# Patient Record
Sex: FEMALE | Race: WHITE | NOT HISPANIC OR LATINO | Employment: FULL TIME | URBAN - METROPOLITAN AREA
[De-identification: names, ages, dates, MRNs, and addresses within clinical notes are randomized per-mention and may not be internally consistent; named-entity substitution may affect disease eponyms.]

---

## 2022-03-21 ENCOUNTER — MEDICAL CORRESPONDENCE (OUTPATIENT)
Dept: HEALTH INFORMATION MANAGEMENT | Facility: CLINIC | Age: 52
End: 2022-03-21
Payer: COMMERCIAL

## 2022-03-22 ENCOUNTER — TRANSCRIBE ORDERS (OUTPATIENT)
Dept: OTHER | Age: 52
End: 2022-03-22
Payer: COMMERCIAL

## 2022-03-22 DIAGNOSIS — E04.1 THYROID NODULE: Primary | ICD-10-CM

## 2022-03-23 NOTE — TELEPHONE ENCOUNTER
FUTURE VISIT INFORMATION      FUTURE VISIT INFORMATION:    Date: 22    Time: 2:20PM    [place] [facility] [place]ERRAL INFORMATION:    Referring provider:  Deborah Yan MD      Referring providers clinic:  [facility]     Reason for visit/diagnosis  appt per pt - Thyroid nodule [E04.1]. referred by Dr. Deborah Yan. med recs in [facility].    RECORDS REQUESTED FROM:       Clinic name Comments Records Status Imaging Status   [facility] Family Medicine   22 note from Deborah Yan MD   Care everywhere     [facility] 3/7/22 US FNA   22 US Thyroid   19 US Thyroid   3/25/18 CT Head Care everywhere  req 3/23/22 - PACS   [facility] LABORATORY SERVICES - [facility]  [address]      [place] [address]  Phone: [phone] 3/7/22 THYROID, LEFT, FINE NEEDLE ASPIRATION ([id number])    *trackin Care everywhere    Path req 3/23/22    Received 3/28/22                        3/23/22 9:02AM Sent a fax to [facility] for images and path - Amay   3/25/22 3:39PM received images in PACS, waiting for path - Amay   3/28/22 9AM received path, sent for consult - Amay

## 2022-03-29 ENCOUNTER — LAB (OUTPATIENT)
Dept: LAB | Facility: CLINIC | Age: 52
End: 2022-03-29
Payer: COMMERCIAL

## 2022-03-29 DIAGNOSIS — E04.1 THYROID NODULE: Primary | ICD-10-CM

## 2022-03-29 PROCEDURE — 88321 CONSLTJ&REPRT SLD PREP ELSWR: CPT | Mod: GC | Performed by: PATHOLOGY

## 2022-04-03 ENCOUNTER — HEALTH MAINTENANCE LETTER (OUTPATIENT)
Age: 52
End: 2022-04-03

## 2022-04-05 LAB
PATH REPORT.COMMENTS IMP SPEC: NORMAL
PATH REPORT.FINAL DX SPEC: NORMAL
PATH REPORT.GROSS SPEC: NORMAL
PATH REPORT.MICROSCOPIC SPEC OTHER STN: NORMAL
PATH REPORT.RELEVANT HX SPEC: NORMAL
PATH REPORT.RELEVANT HX SPEC: NORMAL
PATH REPORT.SITE OF ORIGIN SPEC: NORMAL

## 2022-04-11 NOTE — PROGRESS NOTES
Dear Dr. Yan:    I had the pleasure of meeting Ashwini Livingston in consultation today at the [facility] at your request.     History of Present Illness:   Ashwini Livingston is a 51 year old woman referred for evaluation of a thyroid nodule. She saw her PCP on 2/11/2022 for routine health maintenance. At that time she was experiencing some throat tenderness, with tenderness on exam over the thyroid. She had a thyroid U/S performed on 2/16/2022 which showed a mixed solid and cystic nodule in the left thyroid measuring 5.9 x 3.5 x 4.7 cm, 2 mm cyst in the right thyroid lobe. Her TSH was normal.  An U/S guided FNA was performed on 3/7/2022. Pathology on the FNA was benign on review here at the [place].    Of note, she was actually diagnosed with the thyroid nodule in 2019, 4.7 x 2.8 x 3.6 cm in size at that time, TIRADS 2.    She says the discomfort in her neck is intermittent now. She feels like she has intermittent swelling. She is more aware of the thyroid mass and it has become more visibly noticeable including to her daughter who accompanied her to the visit. She endorses problems with choking several times per week. She says she can choke with drinking. She is able to take pills but can choke on pills. She is able to clear things that get caught with water. She has no aspiration on water. She has no breathing issues. She has no compressive symptoms. She has no other thyroid problems. She has no history of neck radiation.    She is not a boyd.       She is accompanied by her daughter.      Past medical history: hypertension, migraines, depression    Past surgical history: cholecystectomy - no issues    Social history: Nonsmoker. No chewing tobacco. Rare alcohol. Works in [employer] .    Family history: Grandmother with some sort of thyroid issue, unsure type    MEDICATIONS:     Current Outpatient Medications   Medication Sig Dispense Refill     amLODIPine (NORVASC) 5 MG  tablet amlodipine 5 mg tablet   TAKE ONE TABLET BY MOUTH ONCE DAILY       calcium carbonate (OS-VIN) 500 MG tablet Take 1 tablet by mouth 2 times daily       DULoxetine (CYMBALTA) 60 MG capsule duloxetine 60 mg capsule,delayed release   TAKE ONE CAPSULE BY MOUTH ONCE DAILY       fluticasone (FLONASE) 50 MCG/ACT nasal spray Spray 1 spray into both nostrils daily       galcanezumab-gnlm (EMGALITY) 120 MG/ML injection Emgality Pen 120 mg/mL subcutaneous pen injector   INJECT 1ML SUBCUTANEOUSLY ONCE A MONTH IN ABDOMEN, THIGH, OUTER UPPER ARM, OR BUTTOCKS.       metoclopramide (REGLAN) 10 MG tablet metoclopramide 10 mg tablet   TAKE ONE TABLET BY MOUTH ONCE DAILY FOR 30 DAYS       rizatriptan (MAXALT) 10 MG tablet rizatriptan 10 mg tablet       SUMAtriptan (IMITREX) 100 MG tablet sumatriptan 100 mg tablet   TAKE ONE TABLET BY MOUTH AT ONSET OF FOR HEADACHE - MAY REPEAT AFTER 2 HOURS       vitamin B complex with vitamin C (VITAMIN  B COMPLEX) tablet Take 1 tablet by mouth daily       zinc gluconate 50 MG tablet Take 50 mg by mouth daily         ALLERGIES:  No Known Allergies    HABITS/SOCIAL HISTORY:     Nonsmoker. No chewing tobacco. Rare alcohol.   Works in [employer].    Social History     Socioeconomic History     Marital status:      Spouse name: Not on file     Number of children: Not on file     Years of education: Not on file     Highest education level: Not on file   Occupational History     Not on file   Tobacco Use     Smoking status: Not on file     Smokeless tobacco: Not on file   Substance and Sexual Activity     Alcohol use: Not on file     Drug use: Not on file     Sexual activity: Not on file   Other Topics Concern     Not on file   Social History Narrative     Not on file     Social Determinants of Health     Financial Resource Strain: Not on file   Food Insecurity: Not on file   Transportation Needs: Not on file   Physical Activity: Not on file   Stress: Not on file   Social Connections:  "Not on file   Intimate Partner Violence: Not on file   Housing Stability: Not on file       PAST MEDICAL HISTORY: No past medical history on file.     PAST SURGICAL HISTORY: No past surgical history on file.    FAMILY HISTORY:  No family history on file.    REVIEW OF SYSTEMS:  12 point ROS was negative other than the symptoms noted above in the HPI.  Patient Supplied Answers to Review of Systems   ENT ROS 4/12/2022   Constitutional Unexplained fatigue, Problems with sleep   Psychology Frequently feeling depressed or sad   Ears, Nose, Throat Trouble swallowing   Hematologic Easy bruising         PHYSICAL EXAMINATION:  BP (!) 144/89 (BP Location: Right arm, Patient Position: Sitting, Cuff Size: Adult Regular)   Pulse 97   Temp 98.8  F (37.1  C) (Temporal)   Ht 1.676 m (5' 6\")   Wt 76.2 kg (168 lb)   BMI 27.12 kg/m     Appearance:   normal; NAD, age-appropriate appearance, well-developed, normal habitus   Communication:   normal; communicates verbally, normal voice quality   Head/Face:   inspection -  Normal; no scars or visible lesions   Salivary glands -  Normal size, no tenderness, swelling, or palpable masses   Facial strength -  Normal and symmetric    Skin:  normal, no rash   Ears:  auricle (AD) -  normal  EAC (AD) -  normal  TM (AD) -  Normal, no effusion  auricle (AS) -  normal  EAC (AS) -  normal  TM (AS) -  Normal, no effusion  Normal clinical speech reception   Nose:  Ext. inspection -  Normal  Internal Inspection -  Normal mucosa, septum, and turbinates   Nasopharynx:  normal mucosa, no masses   Oral Cavity:  lips -  Normal mucosa, oral competence, and stoma size   Age-appropriate dentition, healthy gingival mucosa   Hard palate, buccal, floor of mouth mucosa normal   Tongue - normal movement, no lesions   Oropharynx:  mucosa -  Normal, no lesions  soft palate -  Normal, no lesions, no asymmetry, normal elevation  tonsils -  Normal, no exudates, no abnormal lesions, symmetric  Base of tongue - " "normal, no masses  Vallecula - clear   Hypopharynx:  Normal pyriform sinus and pharyngeal wall mucosa   No pooled secretions    Larynx:  Epiglottis, AE folds, false vocal cords, true vocal cords, arytenoids normal in appearance, bilaterally mobile cords   Neck: Some visible left thyroid fullness  Palpable thyroid mass of about 5-6 cm in size   Lymphatic:  no abnormal nodes   Cardiovascular:  warm, pink, well-perfused extremities without swelling, tenderness, or edema   Respiratory:  Normal respiratory effort, no stridor   Neuro/Psych.:  mood/affect -  normal  mental status -  normal       PROCEDURES:   Flexible fiberoptic laryngoscopy: Scope exam was indicated due to thyroid nodule. Verbal consent was obtained. The nasal cavity was prepped with an aerosolized solution of topical anesthetic and vasoconstrictive agent. The scope was passed through the anterior nasal cavity and advanced. Inspection of the nasopharynx revealed no gross abnormality. The base of tongue and vallecula are normal. The epiglottis, AE folds, false cords, true cords, arytenoids are normal. Inspection of the larynx revealed bilaterally mobile vocal cords. Pyriform sinuses are symmetric. The airway is patent. Procedure tolerated well with no immediate complications noted.    RESULTS REVIEWED:   I reviewed notes from PCP, thyroid U/S results, path results, TSH    Care discussed with SLP      IMPRESSION AND PLAN:   Ashwini Livingston is a 51 year old woman referred for evaluation of a left thyroid nodule. Biopsy was performed and was benign.    We discussed options for management including observation and surgery. Per the [facility] Guidelines: \"Based on the evidence, it is still unclear if patients with thyroid nodules ?4?cm and benign cytology carry a higher risk of malignancy and should be managed differently than those with smaller nodules.\" We discussed that the nodule has grown in the last few years. She is having dysphagia which could certainly be from the " larger size of the nodule. The mass is also becoming visibly noticeable. If she elected for observation I would recommend repeat U/S in about a year. If she elected for surgery I would offer a hemithyroidectomy.    We discussed the hemithyroidectomy in detail including the risks of the procedure.  I explained the planned incision in the neck.  We discussed the risks to the recurrent laryngeal nerves.  I explained to her that only one nerve is at risk due to the plan of the hemithyroidectomy.  We reviewed the fact that injury to one nerve can result in dysphagia, dysphonia but bilateral injury would result in airway emergency requiring a tracheostomy.  We reviewed the fact that the parathyroid glands are at risk with the procedure but that the contralateral parathyroid glands would remain unaffected. She should not require calcium replacement. I discussed that some patients experience some dysphagia following the procedure related to manipulation of the strap muscles and the area proximity to the esophagus.  I would anticipate these would be temporary. We discussed potential changes in her voice including pitch with injury to the superior laryngeal nerve. She would potentially need long-term thyroid replacement hormone following the surgery pending the function of the contralateral lobe. We did discuss that if the pathology demonstrates a malignancy she could potentially require a completion thyroidectomy.       We also reviewed general surgical risks including bleeding, infection, scarring.  We did discuss if there is bleeding after the surgery this can require a return to the operating room as it can place her at airway risk. She will potentially require placement of a SAGE drain. We discussed the risks of a seroma after removal.    I discussed with her that her dysphagia may be related to the thyroid nodule mass effect in which case her swallowing could improve after surgery. However, it could also be from other  etiologies in which case surgery may have no impact on it. I did have our SLP team see her while she was in clinic today.     She is leaning toward having surgery. We discussed that she can have this done at her convenience and she is leaning toward the fall. We will place orders and work on finding a date. I am happy to do another phone/video visit closer to surgery if she wishes to further discuss surgery/risks. She will need a preop physical which can be done locally.       Thank you very much for the opportunity to participate in the care of your patient.      Kindra Carmona MD  Otolaryngology- Head & Neck Surgery      This note was dictated with voice recognition software and then edited. Please excuse any unintentional errors.         CC:  Deborah Yan MD  [address] [place] [id number]

## 2022-04-13 ENCOUNTER — THERAPY VISIT (OUTPATIENT)
Dept: SPEECH THERAPY | Facility: CLINIC | Age: 52
End: 2022-04-13

## 2022-04-13 ENCOUNTER — OFFICE VISIT (OUTPATIENT)
Dept: OTOLARYNGOLOGY | Facility: CLINIC | Age: 52
End: 2022-04-13
Attending: FAMILY MEDICINE
Payer: COMMERCIAL

## 2022-04-13 ENCOUNTER — PRE VISIT (OUTPATIENT)
Dept: OTOLARYNGOLOGY | Facility: CLINIC | Age: 52
End: 2022-04-13

## 2022-04-13 VITALS
WEIGHT: 168 LBS | HEART RATE: 97 BPM | HEIGHT: 66 IN | TEMPERATURE: 98.8 F | SYSTOLIC BLOOD PRESSURE: 144 MMHG | DIASTOLIC BLOOD PRESSURE: 89 MMHG | BODY MASS INDEX: 27 KG/M2

## 2022-04-13 DIAGNOSIS — E04.1 THYROID NODULE: Primary | ICD-10-CM

## 2022-04-13 DIAGNOSIS — R13.13 PHARYNGEAL DYSPHAGIA: Primary | ICD-10-CM

## 2022-04-13 PROCEDURE — 92610 EVALUATE SWALLOWING FUNCTION: CPT | Mod: GN | Performed by: SPEECH-LANGUAGE PATHOLOGIST

## 2022-04-13 PROCEDURE — 99245 OFF/OP CONSLTJ NEW/EST HI 55: CPT | Mod: 25 | Performed by: OTOLARYNGOLOGY

## 2022-04-13 PROCEDURE — 31575 DIAGNOSTIC LARYNGOSCOPY: CPT | Performed by: OTOLARYNGOLOGY

## 2022-04-13 RX ORDER — GALCANEZUMAB 120 MG/ML
INJECTION, SOLUTION SUBCUTANEOUS
COMMUNITY

## 2022-04-13 RX ORDER — AMLODIPINE BESYLATE 5 MG/1
TABLET ORAL
COMMUNITY
Start: 2022-02-11

## 2022-04-13 RX ORDER — METOCLOPRAMIDE 10 MG/1
TABLET ORAL
COMMUNITY

## 2022-04-13 RX ORDER — CALCIUM CARBONATE 500(1250)
1 TABLET ORAL 2 TIMES DAILY
COMMUNITY

## 2022-04-13 RX ORDER — FLUTICASONE PROPIONATE 50 MCG
1 SPRAY, SUSPENSION (ML) NASAL DAILY
COMMUNITY
End: 2022-12-05

## 2022-04-13 RX ORDER — ZINC GLUCONATE 50 MG
50 TABLET ORAL DAILY
COMMUNITY
End: 2022-12-05

## 2022-04-13 RX ORDER — SUMATRIPTAN 100 MG/1
TABLET, FILM COATED ORAL
COMMUNITY
Start: 2022-02-18

## 2022-04-13 RX ORDER — RIZATRIPTAN BENZOATE 10 MG/1
TABLET ORAL
COMMUNITY
Start: 2022-02-22 | End: 2022-12-05

## 2022-04-13 RX ORDER — DULOXETIN HYDROCHLORIDE 60 MG/1
CAPSULE, DELAYED RELEASE ORAL
COMMUNITY
Start: 2022-02-11

## 2022-04-13 ASSESSMENT — PAIN SCALES - GENERAL: PAINLEVEL: NO PAIN (0)

## 2022-04-13 NOTE — PROGRESS NOTES
Speech-Language Pathology Department   EVALUATION  [facility]  Clinical Swallow Evaluation      04/13/22 0000   General Information   Type Of Visit Initial   Start Of Care Date 04/13/22   Referring Physician Dr. Kindra Carmona   Orders Evaluate And Treat   Orders Comment Clinical Swallow Evaluation   Medical Diagnosis thyroid nodule, dysphagia   Precautions/limitations No Known Precautions/limitations   Hearing Adequate in quiet setting   Pertinent History of Current Problem/OT: Additional Occupational Profile Info Ashwini Livingston is a 51-year-old woman who was seen by Dr. Carmona today for a thyroid nodule. During exam, Ashwini reported some trouble with swallowing foods. She does not have problems with coughing or choking but notices increased effort needed to get foods to get down. She is not modifying her diet at this time but is a bit concerned about the increased effort needed to swallow.   Respiratory Status Room air   Prior Level Of Function Swallowing   Prior Level Of Function Comment Regular solids and thin liquids   Patient Role/employment History Employed  (accounting)   Home/community Accessibility Comments (flowsheet Row) Independent   General Observations Pt highly pleasant and cooperative throughout evaluation   Clinical Swallow Evaluation   Oral Musculature generally intact   Structural Abnormalities present  (thyroid mass)   Dentition present and adequate   Mucosal Quality good   Mandibular Strength and Mobility intact   Oral Labial Strength and Mobility WFL   Lingual Strength and Mobility WFL   Velar Elevation intact   Buccal Strength and Mobility intact   Laryngeal Function Cough;Throat clear;Swallow;Voicing initiated;Dry swallow palpated   Clinical Swallow Eval: Thin Liquid Texture Trial   Mode of Presentation, Thin Liquids cup;self-fed   Volume of Liquid or Food Presented 4 oz apple juice   Oral Phase of Swallow WFL   Pharyngeal Phase of Swallow intact    Diagnostic Statement No overt clinical s/sx of aspiration/penetration noted on thin liquid trials.   Clinical Swallow Eval: Regular (Solid)   Mode of Presentation self-fed   Volume Presented Toya choiie x2   Oral Phase WFL   Pharyngeal Phase intact   Diagnostic Statement No overt clinical s/sx of aspiration/penetration noted on solid consistency trials. Adequate mastication of cookie and timely swallow demonstrated.   Educational Assessment   Barriers to Learning No barriers   Esophageal Phase of Swallow   Patient reports or presents with symptoms of esophageal dysphagia No   Swallow Eval: Clinical Impressions   Skilled Criteria for Therapy Intervention No problems identified which require skilled intervention   Functional Assessment Scale (FAS) 7   Treatment Diagnosis Safe functional oropharyngeal swallow   Diet texture recommendations Thin liquids (level 0);Regular diet   Predicted Duration of Therapy Intervention (days/wks) Evaluation only, consider video swallow study if swallow symptoms worsen   Risks and Benefits of Treatment have been explained. Yes   Patient, family and/or staff in agreement with Plan of Care Yes   Clinical Impression Comments Ashwini Miki demonstrates safe functional oropharyngeal swallow at this time as characterized by adequate bolus manipulation on thin liquid and solid consistencies, no overt clinical s/sx of aspiration/penetration noted on any consistencies presented, adequate ROM and strength of oral mechanism. Pt demonstrates clear vocal quality during evaluation. Single swallow noted for each trial. Recommend she continue with regular consistency solids and thin liquids. Consider video swallow study if there are changes in swallow function or pt is requiring diet modifications. Provided information on current swallow function and impressions from this evaluation. Pt agreeable.   Total Session Time   SLP Eval: oral/pharyngeal swallow function, clinical minutes ([id number]) 9    Total Evaluation Time 9     Thank you for the referral of Ashwini Livingston. If you have any questions about this report, please contact me using the information below.      Faviola Zendejas MS, CCC-SLP  Speech-Language Pathology  [facility]  Department of Otolaryngology/D&T - 4th floor  Pager: [phone]  Phone: [phone]  Email: [email or web address]

## 2022-05-12 ENCOUNTER — TELEPHONE (OUTPATIENT)
Dept: OTOLARYNGOLOGY | Facility: CLINIC | Age: 52
End: 2022-05-12
Payer: COMMERCIAL

## 2022-05-12 PROBLEM — E04.1 THYROID NODULE: Status: ACTIVE | Noted: 2022-05-12

## 2022-05-12 NOTE — TELEPHONE ENCOUNTER
Called patient to schedule surgery with Dr. Carmona   Date of Surgery: 10/24/2022  Approximate arrival time given:  No  Location of surgery: [facility]  Pre-Op H&P: PCP within 30 days   Post-Op Appt Date: 1-2 weeks    Imaging needed:  No  Scheduled/Discussed COVID-19 Testing: Yes    Authorization Completed (Before Scheduling)  No    Patient aware that pre-op RN will call 2-3 days prior to surgery with arrival time and instructions Yes  Packet sent out: Yes 05/12/22    All patients questions were answered and was instructed to review surgical packet and call back with any questions or concerns.       Carol Ann Cisneros on 5/12/2022 at 12:54 PM

## 2022-05-29 ENCOUNTER — HEALTH MAINTENANCE LETTER (OUTPATIENT)
Age: 52
End: 2022-05-29

## 2022-07-27 ENCOUNTER — MYC MEDICAL ADVICE (OUTPATIENT)
Dept: OTOLARYNGOLOGY | Facility: CLINIC | Age: 52
End: 2022-07-27

## 2022-10-03 ENCOUNTER — HEALTH MAINTENANCE LETTER (OUTPATIENT)
Age: 52
End: 2022-10-03

## 2022-10-19 ENCOUNTER — TELEPHONE (OUTPATIENT)
Dept: OTOLARYNGOLOGY | Facility: CLINIC | Age: 52
End: 2022-10-19

## 2022-10-19 NOTE — TELEPHONE ENCOUNTER
----- Message from Marilyn Way RN sent at 10/19/2022 12:08 PM CDT -----  Regarding: 10/24 surgery  Pt  just found out he has covid and the patient is not feeling well. I gave her Carol Ann's number to get rescheduled, but wanted to give a  heads up. Thanks, Marilyn

## 2022-10-19 NOTE — TELEPHONE ENCOUNTER
Called patient and let her know we will coordinate with Dr. Carmona and get back with her on a date. Patient let me know that she was looking for December    Iesha Rider, Surgery Scheduler 10/19/2022 at 3:52 PM

## 2022-10-24 NOTE — TELEPHONE ENCOUNTER
The pt is anxious to hear from the surgery schedulers to set the date for the procedure. She needs to get it done before the end of the year as she has met her deductible. Please call as soon as you can. Thanks.

## 2022-10-25 NOTE — TELEPHONE ENCOUNTER
Called patient to schedule surgery with Dr. Carmona    Date of Surgery: 11/28    Location of surgery: [facility]    Pre-Op H&P: PCP    Pre/Post Imaging:  Not Applicable    Discussed COVID-19 Testing: Yes    Post-Op Appt Date: 1-2 weeks    Surgery Packet Mailed: NA      Additional comments: STEVIE Rider on 10/25/2022 at 3:57 PM

## 2022-11-23 ENCOUNTER — ANESTHESIA EVENT (OUTPATIENT)
Dept: SURGERY | Facility: AMBULATORY SURGERY CENTER | Age: 52
End: 2022-11-23
Payer: COMMERCIAL

## 2022-11-23 NOTE — ANESTHESIA PREPROCEDURE EVALUATION
Anesthesia Pre-Procedure Evaluation    Patient: Ashwini Livingston   MRN: [id number] : 1970        Procedure : Procedure(s):  left hemithyroidectomy          No past medical history on file.   No past surgical history on file.   No Known Allergies   Social History     Tobacco Use     Smoking status: Not on file     Smokeless tobacco: Not on file   Substance Use Topics     Alcohol use: Not on file      Wt Readings from Last 1 Encounters:   22 76.2 kg (168 lb)        Anesthesia Evaluation            ROS/MED HX  ENT/Pulmonary:     (+) allergic rhinitis,     Neurologic:     (+) migraines,     Cardiovascular:     (+) hypertension-----    METS/Exercise Tolerance:     Hematologic:       Musculoskeletal:       GI/Hepatic:       Renal/Genitourinary:       Endo:     (+) thyroid problem,     Psychiatric/Substance Use:     (+) psychiatric history depression     Infectious Disease:       Malignancy:       Other:            Physical Exam    Airway        Mallampati: II   TM distance: > 3 FB   Neck ROM: full   Mouth opening: > 3 cm    Respiratory Devices and Support         Dental  no notable dental history         Cardiovascular   cardiovascular exam normal          Pulmonary   pulmonary exam normal                OUTSIDE LABS:  CBC: No results found for: WBC, HGB, HCT, PLT  BMP: No results found for: NA, POTASSIUM, CHLORIDE, CO2, BUN, CR, GLC  COAGS: No results found for: PTT, INR, FIBR  POC: No results found for: BGM, HCG, HCGS  HEPATIC: No results found for: ALBUMIN, PROTTOTAL, ALT, AST, GGT, ALKPHOS, BILITOTAL, BILIDIRECT, ANIYAH  OTHER: No results found for: PH, LACT, A1C, VIN, PHOS, MAG, LIPASE, AMYLASE, TSH, T4, T3, CRP, SED    Anesthesia Plan    ASA Status:  2   NPO Status:  NPO Appropriate    Anesthesia Type: General.     - Airway: ETT   Induction: Intravenous, Propofol.   Maintenance: Balanced.   Techniques and Equipment:     - Airway: Video-Laryngoscope         Consents    Anesthesia Plan(s) and associated risks,  benefits, and realistic alternatives discussed. Questions answered and patient/representative(s) expressed understanding.    - Discussed:     - Discussed with:  Patient      - Extended Intubation/Ventilatory Support Discussed: No.      - Patient is DNR/DNI Status: No    Use of blood products discussed: No .     Postoperative Care    Pain management: IV analgesics, Oral pain medications, Multi-modal analgesia.   PONV prophylaxis: Ondansetron (or other 5HT-3), Dexamethasone or Solumedrol, Background Propofol Infusion     Comments:    Other Comments: Remifentanil gtt  NIM tube  Glidescope            Kd Jenkins MD

## 2022-11-28 ENCOUNTER — ANESTHESIA (OUTPATIENT)
Dept: SURGERY | Facility: AMBULATORY SURGERY CENTER | Age: 52
End: 2022-11-28
Payer: COMMERCIAL

## 2022-11-28 ENCOUNTER — HOSPITAL ENCOUNTER (OUTPATIENT)
Facility: AMBULATORY SURGERY CENTER | Age: 52
Discharge: HOME | End: 2022-11-28
Attending: OTOLARYNGOLOGY
Payer: COMMERCIAL

## 2022-11-28 VITALS
OXYGEN SATURATION: 96 % | SYSTOLIC BLOOD PRESSURE: 130 MMHG | HEIGHT: 66 IN | HEART RATE: 97 BPM | WEIGHT: 160 LBS | BODY MASS INDEX: 25.71 KG/M2 | DIASTOLIC BLOOD PRESSURE: 77 MMHG | TEMPERATURE: 98.3 F | RESPIRATION RATE: 18 BRPM

## 2022-11-28 DIAGNOSIS — E04.1 THYROID NODULE: Primary | ICD-10-CM

## 2022-11-28 LAB
HCG UR QL: NEGATIVE
INTERNAL QC OK POCT: NORMAL
POCT KIT EXPIRATION DATE: NORMAL
POCT KIT LOT NUMBER: NORMAL

## 2022-11-28 PROCEDURE — 81025 URINE PREGNANCY TEST: CPT | Performed by: PATHOLOGY

## 2022-11-28 PROCEDURE — 88307 TISSUE EXAM BY PATHOLOGIST: CPT | Mod: 26 | Performed by: PATHOLOGY

## 2022-11-28 PROCEDURE — 60220 PARTIAL REMOVAL OF THYROID: CPT | Mod: LT | Performed by: OTOLARYNGOLOGY

## 2022-11-28 PROCEDURE — 60220 PARTIAL REMOVAL OF THYROID: CPT | Mod: LT

## 2022-11-28 PROCEDURE — 88307 TISSUE EXAM BY PATHOLOGIST: CPT | Mod: TC | Performed by: OTOLARYNGOLOGY

## 2022-11-28 RX ORDER — CEFAZOLIN SODIUM 2 G/50ML
2 SOLUTION INTRAVENOUS
Status: COMPLETED | OUTPATIENT
Start: 2022-11-28 | End: 2022-11-28

## 2022-11-28 RX ORDER — GLYCOPYRROLATE 0.2 MG/ML
INJECTION, SOLUTION INTRAMUSCULAR; INTRAVENOUS PRN
Status: DISCONTINUED | OUTPATIENT
Start: 2022-11-28 | End: 2022-11-28

## 2022-11-28 RX ORDER — MAGNESIUM HYDROXIDE 1200 MG/15ML
LIQUID ORAL PRN
Status: DISCONTINUED | OUTPATIENT
Start: 2022-11-28 | End: 2022-11-28 | Stop reason: HOSPADM

## 2022-11-28 RX ORDER — PROPOFOL 10 MG/ML
INJECTION, EMULSION INTRAVENOUS CONTINUOUS PRN
Status: DISCONTINUED | OUTPATIENT
Start: 2022-11-28 | End: 2022-11-28

## 2022-11-28 RX ORDER — OXYCODONE HYDROCHLORIDE 5 MG/1
5-10 TABLET ORAL EVERY 4 HOURS PRN
Qty: 20 TABLET | Refills: 0 | Status: SHIPPED | OUTPATIENT
Start: 2022-11-28 | End: 2022-12-05

## 2022-11-28 RX ORDER — LIDOCAINE 40 MG/G
CREAM TOPICAL
Status: DISCONTINUED | OUTPATIENT
Start: 2022-11-28 | End: 2022-11-28 | Stop reason: HOSPADM

## 2022-11-28 RX ORDER — ACETAMINOPHEN 325 MG/1
650 TABLET ORAL EVERY 4 HOURS PRN
Qty: 50 TABLET | Refills: 0 | Status: SHIPPED | OUTPATIENT
Start: 2022-11-28

## 2022-11-28 RX ORDER — OXYCODONE HYDROCHLORIDE 5 MG/1
5 TABLET ORAL ONCE
Status: COMPLETED | OUTPATIENT
Start: 2022-11-28 | End: 2022-11-28

## 2022-11-28 RX ORDER — FENTANYL CITRATE 50 UG/ML
25 INJECTION, SOLUTION INTRAMUSCULAR; INTRAVENOUS EVERY 5 MIN PRN
Status: DISCONTINUED | OUTPATIENT
Start: 2022-11-28 | End: 2022-11-28 | Stop reason: HOSPADM

## 2022-11-28 RX ORDER — FENTANYL CITRATE 50 UG/ML
50 INJECTION, SOLUTION INTRAMUSCULAR; INTRAVENOUS EVERY 5 MIN PRN
Status: DISCONTINUED | OUTPATIENT
Start: 2022-11-28 | End: 2022-11-28 | Stop reason: HOSPADM

## 2022-11-28 RX ORDER — MEPERIDINE HYDROCHLORIDE 25 MG/ML
12.5 INJECTION INTRAMUSCULAR; INTRAVENOUS; SUBCUTANEOUS
Status: DISCONTINUED | OUTPATIENT
Start: 2022-11-28 | End: 2022-11-29 | Stop reason: HOSPADM

## 2022-11-28 RX ORDER — DEXAMETHASONE SODIUM PHOSPHATE 4 MG/ML
INJECTION, SOLUTION INTRA-ARTICULAR; INTRALESIONAL; INTRAMUSCULAR; INTRAVENOUS; SOFT TISSUE PRN
Status: DISCONTINUED | OUTPATIENT
Start: 2022-11-28 | End: 2022-11-28

## 2022-11-28 RX ORDER — LIDOCAINE HYDROCHLORIDE 20 MG/ML
INJECTION, SOLUTION INFILTRATION; PERINEURAL PRN
Status: DISCONTINUED | OUTPATIENT
Start: 2022-11-28 | End: 2022-11-28

## 2022-11-28 RX ORDER — AMOXICILLIN 250 MG
1-2 CAPSULE ORAL 2 TIMES DAILY
Qty: 30 TABLET | Refills: 0 | Status: SHIPPED | OUTPATIENT
Start: 2022-11-28

## 2022-11-28 RX ORDER — ACETAMINOPHEN 325 MG/1
975 TABLET ORAL ONCE
Status: COMPLETED | OUTPATIENT
Start: 2022-11-28 | End: 2022-11-28

## 2022-11-28 RX ORDER — PROPOFOL 10 MG/ML
INJECTION, EMULSION INTRAVENOUS PRN
Status: DISCONTINUED | OUTPATIENT
Start: 2022-11-28 | End: 2022-11-28

## 2022-11-28 RX ORDER — CEFAZOLIN SODIUM 2 G/50ML
2 SOLUTION INTRAVENOUS SEE ADMIN INSTRUCTIONS
Status: DISCONTINUED | OUTPATIENT
Start: 2022-11-28 | End: 2022-11-28 | Stop reason: HOSPADM

## 2022-11-28 RX ORDER — HYDROMORPHONE HYDROCHLORIDE 1 MG/ML
0.2 INJECTION, SOLUTION INTRAMUSCULAR; INTRAVENOUS; SUBCUTANEOUS EVERY 5 MIN PRN
Status: DISCONTINUED | OUTPATIENT
Start: 2022-11-28 | End: 2022-11-28 | Stop reason: HOSPADM

## 2022-11-28 RX ORDER — DEXAMETHASONE SODIUM PHOSPHATE 10 MG/ML
10 INJECTION, SOLUTION INTRAMUSCULAR; INTRAVENOUS ONCE
Status: DISCONTINUED | OUTPATIENT
Start: 2022-11-28 | End: 2022-11-28 | Stop reason: HOSPADM

## 2022-11-28 RX ORDER — EPHEDRINE SULFATE 50 MG/ML
INJECTION, SOLUTION INTRAMUSCULAR; INTRAVENOUS; SUBCUTANEOUS PRN
Status: DISCONTINUED | OUTPATIENT
Start: 2022-11-28 | End: 2022-11-28

## 2022-11-28 RX ORDER — LIDOCAINE HYDROCHLORIDE AND EPINEPHRINE 10; 10 MG/ML; UG/ML
INJECTION, SOLUTION INFILTRATION; PERINEURAL PRN
Status: DISCONTINUED | OUTPATIENT
Start: 2022-11-28 | End: 2022-11-28 | Stop reason: HOSPADM

## 2022-11-28 RX ORDER — HALOPERIDOL 5 MG/ML
1 INJECTION INTRAMUSCULAR
Status: DISCONTINUED | OUTPATIENT
Start: 2022-11-28 | End: 2022-11-29 | Stop reason: HOSPADM

## 2022-11-28 RX ORDER — ALBUTEROL SULFATE 0.83 MG/ML
2.5 SOLUTION RESPIRATORY (INHALATION) EVERY 4 HOURS PRN
Status: DISCONTINUED | OUTPATIENT
Start: 2022-11-28 | End: 2022-11-28 | Stop reason: HOSPADM

## 2022-11-28 RX ORDER — FENTANYL CITRATE 50 UG/ML
INJECTION, SOLUTION INTRAMUSCULAR; INTRAVENOUS PRN
Status: DISCONTINUED | OUTPATIENT
Start: 2022-11-28 | End: 2022-11-28

## 2022-11-28 RX ORDER — DEXMEDETOMIDINE HYDROCHLORIDE 4 UG/ML
INJECTION, SOLUTION INTRAVENOUS PRN
Status: DISCONTINUED | OUTPATIENT
Start: 2022-11-28 | End: 2022-11-28

## 2022-11-28 RX ORDER — ONDANSETRON 4 MG/1
4 TABLET, ORALLY DISINTEGRATING ORAL EVERY 30 MIN PRN
Status: DISCONTINUED | OUTPATIENT
Start: 2022-11-28 | End: 2022-11-29 | Stop reason: HOSPADM

## 2022-11-28 RX ORDER — ONDANSETRON 2 MG/ML
INJECTION INTRAMUSCULAR; INTRAVENOUS PRN
Status: DISCONTINUED | OUTPATIENT
Start: 2022-11-28 | End: 2022-11-28

## 2022-11-28 RX ORDER — SODIUM CHLORIDE, SODIUM LACTATE, POTASSIUM CHLORIDE, CALCIUM CHLORIDE 600; 310; 30; 20 MG/100ML; MG/100ML; MG/100ML; MG/100ML
INJECTION, SOLUTION INTRAVENOUS CONTINUOUS
Status: DISCONTINUED | OUTPATIENT
Start: 2022-11-28 | End: 2022-11-28 | Stop reason: HOSPADM

## 2022-11-28 RX ORDER — ONDANSETRON 2 MG/ML
4 INJECTION INTRAMUSCULAR; INTRAVENOUS EVERY 30 MIN PRN
Status: DISCONTINUED | OUTPATIENT
Start: 2022-11-28 | End: 2022-11-29 | Stop reason: HOSPADM

## 2022-11-28 RX ORDER — HYDROMORPHONE HYDROCHLORIDE 1 MG/ML
0.4 INJECTION, SOLUTION INTRAMUSCULAR; INTRAVENOUS; SUBCUTANEOUS EVERY 5 MIN PRN
Status: DISCONTINUED | OUTPATIENT
Start: 2022-11-28 | End: 2022-11-28 | Stop reason: HOSPADM

## 2022-11-28 RX ADMIN — PROPOFOL 50 MG: 10 INJECTION, EMULSION INTRAVENOUS at 08:20

## 2022-11-28 RX ADMIN — LIDOCAINE HYDROCHLORIDE 80 MG: 20 INJECTION, SOLUTION INFILTRATION; PERINEURAL at 07:26

## 2022-11-28 RX ADMIN — DEXMEDETOMIDINE HYDROCHLORIDE 8 MCG: 4 INJECTION, SOLUTION INTRAVENOUS at 07:35

## 2022-11-28 RX ADMIN — DEXMEDETOMIDINE HYDROCHLORIDE 8 MCG: 4 INJECTION, SOLUTION INTRAVENOUS at 07:30

## 2022-11-28 RX ADMIN — FENTANYL CITRATE 25 MCG: 50 INJECTION, SOLUTION INTRAMUSCULAR; INTRAVENOUS at 10:14

## 2022-11-28 RX ADMIN — FENTANYL CITRATE 100 MCG: 50 INJECTION, SOLUTION INTRAMUSCULAR; INTRAVENOUS at 07:26

## 2022-11-28 RX ADMIN — FENTANYL CITRATE 25 MCG: 50 INJECTION, SOLUTION INTRAMUSCULAR; INTRAVENOUS at 09:55

## 2022-11-28 RX ADMIN — PROPOFOL 150 MCG/KG/MIN: 10 INJECTION, EMULSION INTRAVENOUS at 08:14

## 2022-11-28 RX ADMIN — ACETAMINOPHEN 975 MG: 325 TABLET ORAL at 06:27

## 2022-11-28 RX ADMIN — Medication 100 MCG: at 07:42

## 2022-11-28 RX ADMIN — OXYCODONE HYDROCHLORIDE 5 MG: 5 TABLET ORAL at 09:51

## 2022-11-28 RX ADMIN — Medication 100 MCG: at 07:53

## 2022-11-28 RX ADMIN — GLYCOPYRROLATE 0.2 MG: 0.2 INJECTION, SOLUTION INTRAMUSCULAR; INTRAVENOUS at 07:39

## 2022-11-28 RX ADMIN — EPHEDRINE SULFATE 10 MG: 50 INJECTION, SOLUTION INTRAMUSCULAR; INTRAVENOUS; SUBCUTANEOUS at 07:39

## 2022-11-28 RX ADMIN — FENTANYL CITRATE 25 MCG: 50 INJECTION, SOLUTION INTRAMUSCULAR; INTRAVENOUS at 10:03

## 2022-11-28 RX ADMIN — FENTANYL CITRATE 25 MCG: 50 INJECTION, SOLUTION INTRAMUSCULAR; INTRAVENOUS at 09:43

## 2022-11-28 RX ADMIN — CEFAZOLIN SODIUM 2 G: 2 SOLUTION INTRAVENOUS at 07:35

## 2022-11-28 RX ADMIN — LIDOCAINE HYDROCHLORIDE 20 MG: 20 INJECTION, SOLUTION INFILTRATION; PERINEURAL at 07:30

## 2022-11-28 RX ADMIN — Medication 100 MCG: at 08:03

## 2022-11-28 RX ADMIN — DEXMEDETOMIDINE HYDROCHLORIDE 4 MCG: 4 INJECTION, SOLUTION INTRAVENOUS at 07:39

## 2022-11-28 RX ADMIN — PROPOFOL 150 MCG/KG/MIN: 10 INJECTION, EMULSION INTRAVENOUS at 07:26

## 2022-11-28 RX ADMIN — PROPOFOL 200 MG: 10 INJECTION, EMULSION INTRAVENOUS at 07:26

## 2022-11-28 RX ADMIN — SODIUM CHLORIDE, SODIUM LACTATE, POTASSIUM CHLORIDE, CALCIUM CHLORIDE: 600; 310; 30; 20 INJECTION, SOLUTION INTRAVENOUS at 06:35

## 2022-11-28 RX ADMIN — ONDANSETRON 4 MG: 2 INJECTION INTRAMUSCULAR; INTRAVENOUS at 07:35

## 2022-11-28 RX ADMIN — SODIUM CHLORIDE, SODIUM LACTATE, POTASSIUM CHLORIDE, CALCIUM CHLORIDE: 600; 310; 30; 20 INJECTION, SOLUTION INTRAVENOUS at 07:19

## 2022-11-28 RX ADMIN — DEXAMETHASONE SODIUM PHOSPHATE 10 MG: 4 INJECTION, SOLUTION INTRA-ARTICULAR; INTRALESIONAL; INTRAMUSCULAR; INTRAVENOUS; SOFT TISSUE at 07:30

## 2022-11-28 NOTE — ANESTHESIA POSTPROCEDURE EVALUATION
Patient: Ashwini Livingston    Procedure: Procedure(s):  left hemithyroidectomy       Anesthesia Type:  General    Note:  Disposition: Outpatient   Postop Pain Control: Uneventful            Sign Out: Well controlled pain   PONV:    Neuro/Psych: Uneventful            Sign Out: Acceptable/Baseline neuro status   Airway/Respiratory: Uneventful            Sign Out: Acceptable/Baseline resp. status   CV/Hemodynamics: Uneventful            Sign Out: Acceptable CV status; No obvious hypovolemia; No obvious fluid overload   Other NRE:    DID A NON-ROUTINE EVENT OCCUR?            Last vitals:  Vitals Value Taken Time   /82 11/28/22 1032   Temp 36.8  C (98.2  F) 11/28/22 1032   Pulse 92 11/28/22 1032   Resp 18 11/28/22 1032   SpO2 99 % 11/28/22 1032       Electronically Signed By: Kd Jenkins MD  November 28, 2022  10:42 AM

## 2022-11-28 NOTE — INTERVAL H&P NOTE
"I have reviewed the surgical (or preoperative) H&P that is linked to this encounter, and examined the patient. There are no significant changes    Clinical Conditions Present on Arrival:  Clinically Significant Risk Factors Present on Admission                    # Overweight: Estimated body mass index is 25.82 kg/m  as calculated from the following:    Height as of this encounter: 1.676 m (5' 6\").    Weight as of this encounter: 72.6 kg (160 lb).       "

## 2022-11-28 NOTE — DISCHARGE INSTRUCTIONS
"[facility]  Home Care Following Anesthesia  For 24 hours after surgery:  Get plenty of rest.  A responsible adult must stay with you for at least 24 hours after you leave the surgery center.  Do not drive or use heavy equipment.  If you have weakness or tingling, don't drive or use heavy equipment until this feeling goes away.   Do not drink alcohol.   Avoid strenuous or risky activities.  Ask for help when climbing stairs.  You may feel lightheaded.  IF so, sit for a few minutes before standing.  Have someone help you get up.   If you have nausea (feel sick to your stomach): Drink only clear liquids such as apple juice, ginger ale, broth or 7-Up.  Rest may also help.  Be sure to drink enough fluids.  Move to a regular diet as you feel able.   You may have a slight fever.  Call the doctor if your fever is over 100 F (37.7 C) (taken under the tongue) or lasts longer than 24 hours.  You may have a dry mouth, a sore throat, muscle aches or trouble sleeping. These should go away after 24 hours.  Do not make important or legal decisions.   It is recommended to avoid smoking.   Today you received a Marcaine or bupivacaine block to numb the nerves near your surgery site.  This is a block using local anesthetic or \"numbing\" medication injected around the nerves to anesthetize or \"numb\" the area supplied by those nerves.  This block is injected into the muscle layer near your surgical site.  The medication may numb the location where you had surgery for 6-18 hours, but may last up to 24 hours.  If your surgical site is an arm or leg you should be careful with your affected limb, since it is possible to injure your limb without being aware of it due to the numbing.  Until full feeling returns, you should guard against bumping or hitting your limb, and avoid extreme hot or cold temperatures on the skin.  As the block wears off, the feeling will return as a tingling or prickly sensation " near your surgical site.  You will experience more discomfort from your incision as the feeling returns.  You may want to take a pain pill (a narcotic or Tylenol if this was prescribed by your surgeon) when you start to experience mild pain before the pain beccomes more severe.  If your pain medications do not control your pain you should notifiy your surgeon.    Tips for taking pain medications  To get the best pain relief possible, remember these points:  Take pain medications as directed, before pain becomes severe.  Pain medication can upset your stomach: taking it with food may help.  Constipation is a common side effect of pain medication. Drink plenty of  fluids.  Eat foods high in fiber. Take a stool softener if recommended by your doctor or pharmacist.  Do not drink alcohol, drive or operate machinery while taking pain medications.  Ask about other ways to control pain, such as with heat, ice or relaxation.    Tylenol/Acetaminophen Consumption  To help encourage the safe use of acetaminophen, the makers of TYLENOL  have lowered the maximum daily dose for single-ingredient Extra Strength TYLENOL  (acetaminophen) products sold in the [place] from 8 pills per day (4,000 mg) to 6 pills per day (3,000 mg). The dosing interval has also changed from 2 pills every 4-6 hours to 2 pills every 6 hours.  If you feel your pain relief is insufficient, you may take Tylenol/Acetaminophen in addition to your narcotic pain medication.   Be careful not to exceed 3,000 mg of Tylenol/Acetaminophen in a 24 hour period from all sources.  If you are taking extra strength Tylenol/acetaminophen (500 mg), the maximum dose is 6 tablets in 24 hours.  If you are taking regular strength acetaminophen (325 mg), the maximum dose is 9 tablets in 24 hours.    Call a doctor for any of the following:  Signs of infection (fever, growing tenderness at the surgery site, a large amount of drainage or bleeding, severe pain, foul-smelling drainage,  redness, swelling).  It has been over 8 to 10 hours since surgery and you are still not able to urinate (pass water).  Headache for over 24 hours.  Numbness, tingling or weakness the day after surgery (if you had spinal anesthesia).  Signs of Covid-19 infection (temperature over 100 degrees, shortness of breath, cough, loss of taste/smell, generalized body aches, persistent headache, chills, sore throat, nausea/vomiting/diarrhea)  Your doctor is:  Dr. Kindra Carmona, ENT Otolaryngology: [phone]                    Or dial [phone] and ask for the resident on call for:  ENT Otolaryngology  For emergency care, call the:  [facility]:  [phone] (TTY for hearing impaired: [phone])                Caring for Your Ehsan-Rsoales Drain    You have been discharged with a Ehsan-Rosales drainage tube. This tube drains fluid from your incision, helping prevent swelling and reducing the risk for infection. The tube is held in place by a few stitches. The drain will be removed when your doctor determines you no longer need it and when the amount of drainage decreases. The color and amount of fluid varies. Right after surgery, the fluid may be bright red and may become clearer over time.   Dressing:  Keep the skin around the tube dry.  If you have a dressing, change it every day.   Wash your hands.  Remove the old bandage. Do not use scissors-you may accidentally cut the tube.  Check for any redness, swelling, drainage, or broken stitches. (Call your doctor with any of these findings).  Wash your hands again.  Wet a cotton swab (Q-tip) and clean around the incision and the tube site. Use normal saline solution (salt and water) or soap and water. Start at the tube site and move outward in a circular motion.   Pat dry.  Put a new bandage on the incision and tube site. Make the bandage large enough to cover the whole incision area.   Tape the bandage in place.  Throw out old materials and wash your  hands.   Home Care:  Tape the tube to the skin below the bandage. Make sure to keep some slack in the tube to keep it from pulling out.   DO NOT sleep on the same side as the tube.  Secure the tube and bulb inside your clothing with a safety pin. This helps keep the tube from being pulled out.   Keep the bulb compressed at all times, except when you empty it.  Empty your drain at least twice a day. Empty it more often if the drain is full.   Lift the opening of the drain.  Drain the fluid into a measuring cup.  Record the amount of fluid each time you empty. Share the information with your doctor at your follow-up visit.   Squeeze the bulb with your hands until you hear air coming out of the bulb.  Close the opening.   Tape plastic wrap over the bandage and tube site when you shower.    Stripping  the tube helps keep blood clots from blocking the tube.                         ONLY DO THIS IF YOUR DOCTOR INSTRUCTED YOU TO DO SO!  Hold the tubing where it leaves the skin with one hand. This keeps it from pulling on the skin.  Pinch the tubing with the thumb and first finger of your other hand.   Slowly and firmly pull your thumb and first finger down the tube (squeezing the tube between your fingers). Keep squeezing the tube as you run your fingers towards the bulb. If the pulling hurts or feels like it is coming out of the skin, STOP. Begin again more gently.  Let go of the tubing with both hands. If the tube is still blocked, repeat these steps three or four times. Make sure that the bulb is compressed so it creates suction.  When to call your doctor:  New or increased pain around the tube  Redness, warmth, or swelling around the incision or tube  Drainage that is foul smelling  Vomiting  Fever over 101 F degrees  Fluid leaking around the tube  Incision seems not to be healing  Stitches become loose  The tube falls out  Drainage that changes from light pick to dark red  A sudden increase or decrease in the amount of  drainage (over 30 ml).  Your drainage record:  Date Time Bulb 1: Amount of drainage (ml or cc)  Notes

## 2022-11-28 NOTE — OP NOTE
Date of Procedure: 11/28/2022    Attending Physician: Kindra Carmona MD    Resident Physicians: Jason Pierre MD    Procedure Performed:  Left hemithyroidectomy    Preoperative Diagnosis: thyroid nodule    Postoperative Diagnosis: same    Anesthesia: General    Blood loss: minimal    Specimens:   ID Type Source Tests Collected by Time Destination   1 : Left hemithyroidectomy stitch superior Tissue Thyroid, left SURGICAL PATHOLOGY EXAM Kindra Carmona MD 11/28/2022  8:08 AM          Implants:  SAGE drain x 1    Complications: None    Findings:  Large left thyroid lobe  Recurrent laryngeal nerve preserved and stimulated at end of case at 0.5 mA  Identification of both parathyroid glands    Indications:  Ashwini Livingston is a 52 year old woman with a left thyroid nodule. She saw her PCP on 2/11/2022 for routine health maintenance. At that time she was experiencing some throat tenderness, with tenderness on exam over the thyroid. She had a thyroid U/S performed on 2/16/2022 which showed a mixed solid and cystic nodule in the left thyroid measuring 5.9 x 3.5 x 4.7 cm, 2 mm cyst in the right thyroid lobe. An U/S guided FNA on 3/7/2022 was benign. The mass has become more visible with associated dysphagia. She has elected for removal with left hemithyroidectomy.     Description of Procedure:  After informed consent was obtained, the patient was brought back to the operating room and placed in a supine position. A shoulder roll was placed. General anesthesia was induced and the patient was orotracheally intubated with a NIMS tube using the Glidescope. The grounding electrodes were placed. The patient was prepped and draped in sterile fashion. A time out was performed. The planned incision had been marked in a skin crease in the preoperative area. This was injected with 1% lidocaine with 1:100,000 epinephrine. A 15 blade was used to make an incision through the skin. This was extended down through the platysma using the  monopolar cautery. Skin flaps were raised superiorly and inferiorly. The midline raphe of the strap muscles was identified with difficulty, with the raphe deviated from the midline secondary to the mass effect of the large left thyroid lobe. The strap muscles were released off the left thyroid lobe using blunt dissection with kitners and a bipolar was used to cauterize any small vessels. The dissection was extended superiorly until the superior pole was identified. Laterally the dissection was extended to the carotid sheath, and inferiorly to the inferior border of the thyroid gland. Blunt dissection was performed of the superior pole to start releasing the superior thyroid vessels, taking care to clip and divide the vessels. There were multiple vessels at the superior pole. The dissection was extended laterally and the middle thyroid vessel was divided. The pyramidal lobe was dissected and left in continuity with the left thyroid lobe. The thyroid isthmus was released off the trachea and divided with the monopolar cautery. Hemostasis was ensured along the isthmus with bipolar cautery. The thyroid continued to be released along the capsule along the lateral border. The vessels were clipped and divided along the inferior pole. Additional dissection was carried out along the trachea. This allowed the gland to be mobilized enough that the gland could be retracted out of the wound. This improved visualization of the deep aspect of the thyroid. The recurrent laryngeal nerve was identified and tested with the Pras probe at 0.5 mA. The thyroid was released from along the recurrent laryngeal nerve, taking care to test the tissue prior to dividing it. There were multiple small vessels along the recurrent nerve which were clipped and bipolared. Once the gland was released from the nerve the bipolar cautery was used to release the rest of the gland from the lateral trachea. A marking stitch was placed on the gland and it was  handed off to nursing for permanent pathology. The wound was irrigated with saline. Careful hemostasis was achieved with bipolar cautery and clips. Considerable time was spent ensure hemostasis at the superior pole with additional clips placed. A sustained valsalva maneuver was performed multiple times to ensure hemostasis. A 7 mm flat SAGE drain was placed and secured to the skin with a 3-0 nylon. The midline raphe of the strap muscles was reapproximated with a 3-0 vicryl in interrupted horizontal mattress fashion. The skin was closed with 3-0 buried interrupted vicryl in the platysma and deep dermis. The skin was closed with a running 4-0 monocryl in subcuticular fashion. The patient was handed back to anesthesia for extubation. She was transported to the recovery room in stable condition with no immediate complications.    I was present for and participated in the entire procedure.      Kindra Carmona MD    Department of Otolaryngology

## 2022-11-28 NOTE — ANESTHESIA CARE TRANSFER NOTE
Patient: Ashwini Livingston    Procedure: Procedure(s):  left hemithyroidectomy       Diagnosis: Thyroid nodule [E04.1]  Diagnosis Additional Information: No value filed.    Anesthesia Type:   General     Note:    Oropharynx: oropharynx clear of all foreign objects  Level of Consciousness: awake  Oxygen Supplementation: face mask    Independent Airway: airway patency satisfactory and stable  Dentition: dentition unchanged  Vital Signs Stable: post-procedure vital signs reviewed and stable  Report to RN Given: handoff report given  Patient transferred to: PACU    Handoff Report: Identifed the Patient, Identified the Reponsible Provider, Reviewed the pertinent medical history, Discussed the surgical course, Reviewed Intra-OP anesthesia mangement and issues during anesthesia, Set expectations for post-procedure period and Allowed opportunity for questions and acknowledgement of understanding      Vitals:  Vitals Value Taken Time   /95 11/28/22 0930   Temp     Pulse 94 11/28/22 0932   Resp 12 11/28/22 0932   SpO2 100 % 11/28/22 0932   Vitals shown include unvalidated device data.    Electronically Signed By: SUNNY Coburn CRNA  November 28, 2022  9:33 AM

## 2022-11-28 NOTE — BRIEF OP NOTE
[facility]    Brief Operative Note    Pre-operative diagnosis: Thyroid nodule [E04.1]  Post-operative diagnosis Same as pre-operative diagnosis    Procedure: Procedure(s):  left hemithyroidectomy  Surgeon: Surgeon(s) and Role:     * Kindra Carmona MD - Primary     * Jason Reyes MD - Resident - Assisting  Anesthesia: General   Estimated Blood Loss: Less than 10 ml    Drains: Ehsan-Rosales  Specimens:   ID Type Source Tests Collected by Time Destination   1 : Left hemithyroidectomy stitch superior Tissue Thyroid, left SURGICAL PATHOLOGY EXAM Kindra Carmona MD 11/28/2022  8:08 AM      Findings:   RLN stimulated at 0.5 mA at the end of the case. Superior parathyroid gland identified and preserved.  Complications: None.  Implants: * No implants in log *

## 2022-11-30 ENCOUNTER — TELEPHONE (OUTPATIENT)
Dept: OTOLARYNGOLOGY | Facility: CLINIC | Age: 52
End: 2022-11-30

## 2022-11-30 DIAGNOSIS — E04.1 THYROID NODULE: Primary | ICD-10-CM

## 2022-11-30 RX ORDER — ONDANSETRON 4 MG/1
4 TABLET, ORALLY DISINTEGRATING ORAL EVERY 8 HOURS PRN
Qty: 20 TABLET | Refills: 0 | Status: SHIPPED | OUTPATIENT
Start: 2022-11-30

## 2022-11-30 NOTE — TELEPHONE ENCOUNTER
Responsible Attending Physician:   Date of Discharge: 11/28    Discharge to:  Home     Current Status:  Pt is a 51 y/o female s/p left hemithyroidectomy on 11/28.  Reports pre-op symptoms improved.   Operative  pain is decreasing.  Ambulating without assistance.  Pain well controlled with current meds, ample supply.  Reports incision CDI without signs of infection.  Denies redness, swelling, increased tenderness, or elevated temp.  Denies current bowel or bladder issues.      Patient stated she is feeling nauseous post-surgery. Will order antiemetic for patient.      Discharge instructions and medication use were reviewed.  RN triage/on call number given:  [phone] or after hours and w/e - [phone].  Patient verbalized understanding and agreement with current plan.

## 2022-12-01 LAB
PATH REPORT.COMMENTS IMP SPEC: NORMAL
PATH REPORT.COMMENTS IMP SPEC: NORMAL
PATH REPORT.FINAL DX SPEC: NORMAL
PATH REPORT.GROSS SPEC: NORMAL
PATH REPORT.MICROSCOPIC SPEC OTHER STN: NORMAL
PATH REPORT.RELEVANT HX SPEC: NORMAL
PHOTO IMAGE: NORMAL

## 2022-12-02 ENCOUNTER — TUMOR CONFERENCE (OUTPATIENT)
Dept: ONCOLOGY | Facility: CLINIC | Age: 52
End: 2022-12-02
Payer: COMMERCIAL

## 2022-12-02 NOTE — TUMOR CONFERENCE
Called patient to review surgical pathology.     Final Diagnosis   THYROID GLAND, LEFT HEMITHYROIDECTOMY:  - Benign hyperplastic colloid nodule, 6.0 cm in greatest dimension.  - Biopsy site changes are present.  - Negative for malignancy     Patient verbalized understanding. Will follow up as scheduled with post-op appointment.

## 2022-12-03 NOTE — PROGRESS NOTES
Dear Dr. Yan:    I had the pleasure of seeing Ashwini Livingston in follow-up today at the [facility].     History of Present Illness:   Ashwini Livingston is a 52 year old woman with a benign thyroid nodule. She saw her PCP on 2/11/2022 for routine health maintenance. At that time she was experiencing some throat tenderness, with tenderness on exam over the thyroid. She had a thyroid U/S performed on 2/16/2022 which showed a mixed solid and cystic nodule in the left thyroid measuring 5.9 x 3.5 x 4.7 cm, 2 mm cyst in the right thyroid lobe. Her TSH was normal.  An U/S guided FNA was performed on 3/7/2022. Pathology on the FNA was benign on review here at the [place].    Of note, she was actually diagnosed with the thyroid nodule in 2019, 4.7 x 2.8 x 3.6 cm in size at that time, TIRADS 2.      Interval history:  She comes in today for follow-up. She was last seen in clinic in April 2022. She was taken to the OR on 11/28/2022 for left hemithyroidectomy. Final pathology showed a 6 cm benign thyroid nodule. She says she is having no pain. She is having ongoing nausea. She has been using zofran but symptoms recur. She has no choking with eating. She feels like the pressure on her neck is better.       MEDICATIONS:     Current Outpatient Medications   Medication Sig Dispense Refill     acetaminophen (TYLENOL) 325 MG tablet Take 2 tablets (650 mg) by mouth every 4 hours as needed for mild pain 50 tablet 0     amLODIPine (NORVASC) 5 MG tablet amlodipine 5 mg tablet   TAKE ONE TABLET BY MOUTH ONCE DAILY       DULoxetine (CYMBALTA) 60 MG capsule duloxetine 60 mg capsule,delayed release   TAKE ONE CAPSULE BY MOUTH ONCE DAILY       galcanezumab-gnlm (EMGALITY) 120 MG/ML injection Emgality Pen 120 mg/mL subcutaneous pen injector   INJECT 1ML SUBCUTANEOUSLY ONCE A MONTH IN ABDOMEN, THIGH, OUTER UPPER ARM, OR BUTTOCKS.       metoclopramide (REGLAN) 10 MG tablet metoclopramide 10 mg tablet   TAKE ONE  TABLET BY MOUTH ONCE DAILY FOR 30 DAYS       ondansetron (ZOFRAN ODT) 4 MG ODT tab Take 1 tablet (4 mg) by mouth every 8 hours as needed for nausea 20 tablet 0     scopolamine (TRANSDERM) 1 MG/3DAYS 72 hr patch Place 1 patch onto the skin every 72 hours 2 patch 0     senna-docusate (SENOKOT-S/PERICOLACE) 8.6-50 MG tablet Take 1-2 tablets by mouth 2 times daily 30 tablet 0     SUMAtriptan (IMITREX) 100 MG tablet sumatriptan 100 mg tablet   TAKE ONE TABLET BY MOUTH AT ONSET OF FOR HEADACHE - MAY REPEAT AFTER 2 HOURS       calcium carbonate (OS-VIN) 500 MG tablet Take 1 tablet by mouth 2 times daily       fluticasone (FLONASE) 50 MCG/ACT nasal spray Spray 1 spray into both nostrils daily       oxyCODONE (ROXICODONE) 5 MG tablet Take 1-2 tablets (5-10 mg) by mouth every 4 hours as needed for moderate to severe pain 20 tablet 0     rizatriptan (MAXALT) 10 MG tablet rizatriptan 10 mg tablet       vitamin B complex with vitamin C (STRESS TAB) tablet Take 1 tablet by mouth daily       zinc gluconate 50 MG tablet Take 50 mg by mouth daily         ALLERGIES:  No Known Allergies    HABITS/SOCIAL HISTORY:     Nonsmoker. No chewing tobacco. Rare alcohol.   Works in [employer].    Social History     Socioeconomic History     Marital status:      Spouse name: Not on file     Number of children: Not on file     Years of education: Not on file     Highest education level: Not on file   Occupational History     Not on file   Tobacco Use     Smoking status: Not on file     Smokeless tobacco: Not on file   Substance and Sexual Activity     Alcohol use: Not on file     Drug use: Not on file     Sexual activity: Not on file   Other Topics Concern     Not on file   Social History Narrative     Not on file     Social Determinants of Health     Financial Resource Strain: Not on file   Food Insecurity: Not on file   Transportation Needs: Not on file   Physical Activity: Not on file   Stress: Not on file   Social Connections:  "Not on file   Intimate Partner Violence: Not on file   Housing Stability: Not on file       PAST MEDICAL HISTORY: No past medical history on file.     PAST SURGICAL HISTORY:   Past Surgical History:   Procedure Laterality Date     EXCISE NODULE THYROID Left 11/28/2022    Procedure: left hemithyroidectomy;  Surgeon: Kindra Carmona MD;  [place] [facility] [place]       FAMILY HISTORY:  No family history on file.    REVIEW OF SYSTEMS:  12 point ROS was negative other than the symptoms noted above in the HPI.  Patient Supplied Answers to Review of Systems  [facility] ROS 4/12/2022   Constitutional: Unexplained fatigue, Problems with sleep   Psychology: Frequently feeling depressed or sad   Ears, Nose, Throat: Trouble swallowing   Hematologic: Easy bruising         PHYSICAL EXAMINATION:  BP (!) 144/91 (BP Location: Right arm, Patient Position: Sitting, Cuff Size: Adult Regular)   Pulse 93   Temp 98.3  F (36.8  C) (Temporal)   Ht 1.676 m (5' 6\")   Wt 73.7 kg (162 lb 6.4 oz)   SpO2 100%   BMI 26.21 kg/m     Patient in NAD  Breathing comfortably on room air  Voice normal  Incision healing appropriately with dermabond in place, no signs of infections, no seroma      PROCEDURES:       RESULTS REVIEWED:   Path report reviewed      IMPRESSION AND PLAN:   Ashwini Livingston is a 52 year old woman with a left thyroid nodule. Biopsy was performed and was benign. She elected for surgical resection with hemithyroidectomy, performed on 11/28/2022. Final pathology was benign.    We discussed pathology today.    We reviewed wound care instructions.     She is still having nausea, prescribed scopolamine patch. Can continue to do zofran.     She should have her TSH checked in 2-3 months. Can be done by PCP which she is comfortable with.     Follow-up as needed.    Thank you very much for the opportunity to participate in the care of your patient.      Kindra Carmona MD  Otolaryngology- Head & Neck Surgery      This note was dictated with voice " recognition software and then edited. Please excuse any unintentional errors.         CC:  Deborah Yan MD  [address] [place] [address]

## 2022-12-05 ENCOUNTER — OFFICE VISIT (OUTPATIENT)
Dept: OTOLARYNGOLOGY | Facility: CLINIC | Age: 52
End: 2022-12-05
Payer: COMMERCIAL

## 2022-12-05 VITALS
OXYGEN SATURATION: 100 % | TEMPERATURE: 98.3 F | BODY MASS INDEX: 26.1 KG/M2 | SYSTOLIC BLOOD PRESSURE: 144 MMHG | DIASTOLIC BLOOD PRESSURE: 91 MMHG | HEIGHT: 66 IN | HEART RATE: 93 BPM | WEIGHT: 162.4 LBS

## 2022-12-05 DIAGNOSIS — E04.1 THYROID NODULE: Primary | ICD-10-CM

## 2022-12-05 DIAGNOSIS — Z98.890 POSTOPERATIVE NAUSEA: ICD-10-CM

## 2022-12-05 DIAGNOSIS — R11.0 POSTOPERATIVE NAUSEA: ICD-10-CM

## 2022-12-05 PROCEDURE — 99024 POSTOP FOLLOW-UP VISIT: CPT | Performed by: OTOLARYNGOLOGY

## 2022-12-05 RX ORDER — SCOLOPAMINE TRANSDERMAL SYSTEM 1 MG/1
1 PATCH, EXTENDED RELEASE TRANSDERMAL
Qty: 2 PATCH | Refills: 0 | Status: SHIPPED | OUTPATIENT
Start: 2022-12-05

## 2022-12-05 ASSESSMENT — PAIN SCALES - GENERAL: PAINLEVEL: NO PAIN (0)

## 2022-12-05 NOTE — NURSING NOTE
"Blood pressure (!) 144/91, pulse 93, temperature 98.3  F (36.8  C), temperature source Temporal, height 1.676 m (5' 6\"), weight 73.7 kg (162 lb 6.4 oz), SpO2 100 %.    Patient denies pain, but does report that she is nauseated most of the day.  The nausea comes in waves.  Patient is able to eat, but does not have an appetite.  Patient unable to associate what makes nausea worse/better.  Patient does say that the nausea medication prescribed to her eases the symptoms, but does not completely clear them up.  Maribel Mills LPN    "

## 2022-12-05 NOTE — LETTER
12/5/2022       RE: Ashwini Livingston  [address]  [place] [address]     Dear Colleague,    Thank you for referring your patient, Ashwini Livingston, to the [facility] at [facility]. Please see a copy of my visit note below.    Dear Dr. Yan:    I had the pleasure of seeing Ashwini Livingston in follow-up today at the [facility].     History of Present Illness:   Ashwini Livingston is a 52 year old woman with a benign thyroid nodule. She saw her PCP on 2/11/2022 for routine health maintenance. At that time she was experiencing some throat tenderness, with tenderness on exam over the thyroid. She had a thyroid U/S performed on 2/16/2022 which showed a mixed solid and cystic nodule in the left thyroid measuring 5.9 x 3.5 x 4.7 cm, 2 mm cyst in the right thyroid lobe. Her TSH was normal.  An U/S guided FNA was performed on 3/7/2022. Pathology on the FNA was benign on review here at the [place].    Of note, she was actually diagnosed with the thyroid nodule in 2019, 4.7 x 2.8 x 3.6 cm in size at that time, TIRADS 2.      Interval history:  She comes in today for follow-up. She was last seen in clinic in April 2022. She was taken to the OR on 11/28/2022 for left hemithyroidectomy. Final pathology showed a 6 cm benign thyroid nodule. She says she is having no pain. She is having ongoing nausea. She has been using zofran but symptoms recur. She has no choking with eating. She feels like the pressure on her neck is better.       MEDICATIONS:     Current Outpatient Medications   Medication Sig Dispense Refill     acetaminophen (TYLENOL) 325 MG tablet Take 2 tablets (650 mg) by mouth every 4 hours as needed for mild pain 50 tablet 0     amLODIPine (NORVASC) 5 MG tablet amlodipine 5 mg tablet   TAKE ONE TABLET BY MOUTH ONCE DAILY       DULoxetine (CYMBALTA) 60 MG capsule duloxetine 60 mg capsule,delayed release   TAKE  ONE CAPSULE BY MOUTH ONCE DAILY       galcanezumab-gnlm (EMGALITY) 120 MG/ML injection Emgality Pen 120 mg/mL subcutaneous pen injector   INJECT 1ML SUBCUTANEOUSLY ONCE A MONTH IN ABDOMEN, THIGH, OUTER UPPER ARM, OR BUTTOCKS.       metoclopramide (REGLAN) 10 MG tablet metoclopramide 10 mg tablet   TAKE ONE TABLET BY MOUTH ONCE DAILY FOR 30 DAYS       ondansetron (ZOFRAN ODT) 4 MG ODT tab Take 1 tablet (4 mg) by mouth every 8 hours as needed for nausea 20 tablet 0     scopolamine (TRANSDERM) 1 MG/3DAYS 72 hr patch Place 1 patch onto the skin every 72 hours 2 patch 0     senna-docusate (SENOKOT-S/PERICOLACE) 8.6-50 MG tablet Take 1-2 tablets by mouth 2 times daily 30 tablet 0     SUMAtriptan (IMITREX) 100 MG tablet sumatriptan 100 mg tablet   TAKE ONE TABLET BY MOUTH AT ONSET OF FOR HEADACHE - MAY REPEAT AFTER 2 HOURS       calcium carbonate (OS-VIN) 500 MG tablet Take 1 tablet by mouth 2 times daily       fluticasone (FLONASE) 50 MCG/ACT nasal spray Spray 1 spray into both nostrils daily       oxyCODONE (ROXICODONE) 5 MG tablet Take 1-2 tablets (5-10 mg) by mouth every 4 hours as needed for moderate to severe pain 20 tablet 0     rizatriptan (MAXALT) 10 MG tablet rizatriptan 10 mg tablet       vitamin B complex with vitamin C (STRESS TAB) tablet Take 1 tablet by mouth daily       zinc gluconate 50 MG tablet Take 50 mg by mouth daily         ALLERGIES:  No Known Allergies    HABITS/SOCIAL HISTORY:     Nonsmoker. No chewing tobacco. Rare alcohol.   Works in [employer].    Social History     Socioeconomic History     Marital status:      Spouse name: Not on file     Number of children: Not on file     Years of education: Not on file     Highest education level: Not on file   Occupational History     Not on file   Tobacco Use     Smoking status: Not on file     Smokeless tobacco: Not on file   Substance and Sexual Activity     Alcohol use: Not on file     Drug use: Not on file     Sexual activity: Not on  "file   Other Topics Concern     Not on file   Social History Narrative     Not on file     Social Determinants of Health     Financial Resource Strain: Not on file   Food Insecurity: Not on file   Transportation Needs: Not on file   Physical Activity: Not on file   Stress: Not on file   Social Connections: Not on file   Intimate Partner Violence: Not on file   Housing Stability: Not on file       PAST MEDICAL HISTORY: No past medical history on file.     PAST SURGICAL HISTORY:   Past Surgical History:   Procedure Laterality Date     EXCISE NODULE THYROID Left 11/28/2022    Procedure: left hemithyroidectomy;  Surgeon: Kindra Carmona MD;  Location: [facility] [place]       FAMILY HISTORY:  No family history on file.    REVIEW OF SYSTEMS:  12 point ROS was negative other than the symptoms noted above in the HPI.  Patient Supplied Answers to Review of Systems  [facility] ROS 4/12/2022   Constitutional: Unexplained fatigue, Problems with sleep   Psychology: Frequently feeling depressed or sad   Ears, Nose, Throat: Trouble swallowing   Hematologic: Easy bruising         PHYSICAL EXAMINATION:  BP (!) 144/91 (BP Location: Right arm, Patient Position: Sitting, Cuff Size: Adult Regular)   Pulse 93   Temp 98.3  F (36.8  C) (Temporal)   Ht 1.676 m (5' 6\")   Wt 73.7 kg (162 lb 6.4 oz)   SpO2 100%   BMI 26.21 kg/m     Patient in NAD  Breathing comfortably on room air  Voice normal  Incision healing appropriately with dermabond in place, no signs of infections, no seroma      PROCEDURES:       RESULTS REVIEWED:   Path report reviewed      IMPRESSION AND PLAN:   Ashwini Livingston is a 52 year old woman with a left thyroid nodule. Biopsy was performed and was benign. She elected for surgical resection with hemithyroidectomy, performed on 11/28/2022. Final pathology was benign.    We discussed pathology today.    We reviewed wound care instructions.     She is still having nausea, prescribed scopolamine patch. Can continue to do zofran.     She " should have her TSH checked in 2-3 months. Can be done by PCP which she is comfortable with.     Follow-up as needed.    Thank you very much for the opportunity to participate in the care of your patient.      Kindra Carmona MD  Otolaryngology- Head & Neck Surgery      This note was dictated with voice recognition software and then edited. Please excuse any unintentional errors.         CC:  Deborah Yan MD  [address] [place] [address]

## 2023-05-21 ENCOUNTER — HEALTH MAINTENANCE LETTER (OUTPATIENT)
Age: 53
End: 2023-05-21

## 2024-05-19 ENCOUNTER — HEALTH MAINTENANCE LETTER (OUTPATIENT)
Age: 54
End: 2024-05-19

## 2025-06-08 ENCOUNTER — HEALTH MAINTENANCE LETTER (OUTPATIENT)
Age: 55
End: 2025-06-08

## (undated) DEVICE — ADH SKIN CLOSURE PREMIERPRO EXOFIN 1.0ML 3470

## (undated) DEVICE — DRAIN JACKSON PRATT RESERVOIR 100ML SU130-1305

## (undated) DEVICE — ESU GROUND PAD ADULT W/CORD E7507

## (undated) DEVICE — TUBE ENDOTRACHEAL NIM TRIVANTAGE 6.0MM 8229706

## (undated) DEVICE — SU VICRYL 3-0 SH 8X18" UND J864D

## (undated) DEVICE — SU SILK 2-0 TIE 12X30" A305H

## (undated) DEVICE — RETR ELASTIC STAYS LONE STAR BLUNT DUAL LEAD 3550-1G

## (undated) DEVICE — ESU ELEC BLADE 2.75" COATED/INSULATED E1455

## (undated) DEVICE — CLIP HORIZON MED BLUE 002200

## (undated) DEVICE — PREP SKIN SCRUB TRAY 4461A

## (undated) DEVICE — ESU PENCIL SMOKE EVAC W/ROCKER SWITCH 0703-047-000

## (undated) DEVICE — SU SILK 4-0 TIE 12X30" A303H

## (undated) DEVICE — NIM PROBE PRASS INCREMENTING TIP 8225825

## (undated) DEVICE — GLOVE PROTEXIS MICRO 6.0  2D73PM60

## (undated) DEVICE — SU SILK 3-0 TIE 12X30" A304H

## (undated) DEVICE — PREP POVIDONE IODINE SOLUTION 10% 4OZ BOTTLE 29906-004

## (undated) DEVICE — PACK ENT MINOR CUSTOM ASC

## (undated) DEVICE — LINEN TOWEL PACK X5 5464

## (undated) DEVICE — DRAIN JACKSON PRATT 10MM FLAT 4/4 PERF SU130-1311

## (undated) DEVICE — SPONGE KITTNER 30-101

## (undated) DEVICE — SUCTION MANIFOLD NEPTUNE 2 SYS 4 PORT 0702-020-000

## (undated) DEVICE — CLIP HORIZON SM RED WIDE SLOT 001201

## (undated) DEVICE — SOL NACL 0.9% IRRIG 500ML BOTTLE 2F7123

## (undated) DEVICE — SU MONOCRYL 4-0 P-3 18" UND Y494G

## (undated) DEVICE — SU ETHILON 3-0 PS-1 18" 1663H

## (undated) DEVICE — PREP POVIDONE-IODINE 7.5% SCRUB 4OZ BOTTLE MDS093945

## (undated) RX ORDER — EPINEPHRINE 1 MG/ML
INJECTION, SOLUTION INTRAMUSCULAR; SUBCUTANEOUS
Status: DISPENSED
Start: 2022-11-28

## (undated) RX ORDER — PROPOFOL 10 MG/ML
INJECTION, EMULSION INTRAVENOUS
Status: DISPENSED
Start: 2022-11-28

## (undated) RX ORDER — OXYCODONE HYDROCHLORIDE 5 MG/1
TABLET ORAL
Status: DISPENSED
Start: 2022-11-28

## (undated) RX ORDER — CEFAZOLIN SODIUM 2 G/50ML
SOLUTION INTRAVENOUS
Status: DISPENSED
Start: 2022-11-28

## (undated) RX ORDER — DEXMEDETOMIDINE HYDROCHLORIDE 4 UG/ML
INJECTION, SOLUTION INTRAVENOUS
Status: DISPENSED
Start: 2022-11-28

## (undated) RX ORDER — ONDANSETRON 2 MG/ML
INJECTION INTRAMUSCULAR; INTRAVENOUS
Status: DISPENSED
Start: 2022-11-28

## (undated) RX ORDER — ACETAMINOPHEN 325 MG/1
TABLET ORAL
Status: DISPENSED
Start: 2022-11-28

## (undated) RX ORDER — DEXAMETHASONE SODIUM PHOSPHATE 4 MG/ML
INJECTION, SOLUTION INTRA-ARTICULAR; INTRALESIONAL; INTRAMUSCULAR; INTRAVENOUS; SOFT TISSUE
Status: DISPENSED
Start: 2022-11-28

## (undated) RX ORDER — EPHEDRINE SULFATE 50 MG/ML
INJECTION, SOLUTION INTRAMUSCULAR; INTRAVENOUS; SUBCUTANEOUS
Status: DISPENSED
Start: 2022-11-28

## (undated) RX ORDER — LIDOCAINE HYDROCHLORIDE 10 MG/ML
INJECTION, SOLUTION EPIDURAL; INFILTRATION; INTRACAUDAL; PERINEURAL
Status: DISPENSED
Start: 2022-11-28

## (undated) RX ORDER — FENTANYL CITRATE 50 UG/ML
INJECTION, SOLUTION INTRAMUSCULAR; INTRAVENOUS
Status: DISPENSED
Start: 2022-11-28

## (undated) RX ORDER — FENTANYL CITRATE-0.9 % NACL/PF 10 MCG/ML
PLASTIC BAG, INJECTION (ML) INTRAVENOUS
Status: DISPENSED
Start: 2022-11-28

## (undated) RX ORDER — GLYCOPYRROLATE 0.2 MG/ML
INJECTION INTRAMUSCULAR; INTRAVENOUS
Status: DISPENSED
Start: 2022-11-28